# Patient Record
Sex: FEMALE | Race: WHITE
[De-identification: names, ages, dates, MRNs, and addresses within clinical notes are randomized per-mention and may not be internally consistent; named-entity substitution may affect disease eponyms.]

---

## 2017-06-30 ENCOUNTER — HOSPITAL ENCOUNTER (EMERGENCY)
Dept: HOSPITAL 61 - ER | Age: 52
Discharge: HOME | End: 2017-06-30
Payer: SELF-PAY

## 2017-06-30 VITALS — SYSTOLIC BLOOD PRESSURE: 140 MMHG | DIASTOLIC BLOOD PRESSURE: 89 MMHG

## 2017-06-30 VITALS — BODY MASS INDEX: 33.13 KG/M2 | WEIGHT: 180 LBS | HEIGHT: 62 IN

## 2017-06-30 DIAGNOSIS — K08.89: ICD-10-CM

## 2017-06-30 DIAGNOSIS — K02.9: Primary | ICD-10-CM

## 2017-06-30 PROCEDURE — 99283 EMERGENCY DEPT VISIT LOW MDM: CPT

## 2017-06-30 NOTE — PHYS DOC
Past Medical History


Past Medical History:  No Pertinent History


Additional Past Medical Histor:  Kwabena gaona


Past Surgical History:  Other


Additional Past Surgical Histo:  Right wrist


Alcohol Use:  None


Drug Use:  None


Social History Narrative:  Recovering Amphetamine User





Adult General


Chief Complaint


Chief Complaint:  DENTAL PROBLEM





HPI


HPI





Patient is a 51  year old female who presents with moderate right lower dental 

pain that began a couple days ago. Patient states the pain is worse when she is 

eating. Patient denies any fever or trismus. She does not have a dentist.





Review of Systems


Review of Systems





Constitutional: Denies fever or chills []


Eyes: Denies change in visual acuity, redness, or eye pain []


HENT: Dental pain


Musculoskeletal: Denies back pain or joint pain []


Integument: Denies rash or skin lesions []


Neurologic: Denies headache, focal weakness or sensory changes []


Endocrine: Denies polyuria or polydipsia []





Allergies


Allergies





Allergies








Coded Allergies Type Severity Reaction Last Updated Verified


 


  No Known Drug Allergies    7/19/15 No











Physical Exam


Physical Exam





Constitutional: Well developed, well nourished, no acute distress, non-toxic 

appearance. []


HENT: Normocephalic, atraumatic, bilateral external ears normal, oropharynx 

moist, no oral exudates, nose normal. []


Teeth number #31, 30 and 29  are infected.


Eyes: PERRLA, EOMI, conjunctiva normal, no discharge. [] 


Skin: Warm, dry, no erythema, no rash. [] 


Back: No tenderness, no CVA tenderness. [] 


Extremities: No tenderness, no cyanosis, no clubbing, ROM intact, no edema. [] 


Neurologic: Alert and oriented X 3, normal motor function, normal sensory 

function, no focal deficits noted. []


Psychologic: Affect normal, judgement normal, mood normal. []





Current Patient Data


Vital Signs





 Vital Signs








  Date Time  Temp Pulse Resp B/P (MAP) Pulse Ox O2 Delivery O2 Flow Rate FiO2


 


6/30/17 20:30 98.0 85 16  94 Room Air  





 98.0       











EKG


EKG


[]





Radiology/Procedures


Radiology/Procedures


[]





Course & Med Decision Making


Course & Med Decision Making


Pertinent Labs and Imaging studies reviewed. (See chart for details)





Patient is in the ED with dental infection from dental caries. Discharged with 

amoxicillin for 10 days. Tylenol/Motrin prescriptions provided for pain. Follow-

up with dentist in 1-2 weeks.





Dragon Disclaimer


Dragon Disclaimer


This electronic medical record was generated, in whole or in part, using a 

voice recognition dictation system.





Departure


Departure


Impression:  


 Primary Impression:  


 Dentalgia


 Additional Impression:  


 Dental caries


Disposition:  01 HOME, SELF-CARE


Condition:  STABLE


Referrals:  


NO PCP (PCP)


Patient Instructions:  Dental Caries





Additional Instructions:  


You were seen for dental pain and infection. Please complete your antibiotics.


Scripts


Ibuprofen (IBUPROFEN) 800 Mg Tablet


800 MG PO PRN Q6HRS Y for INFLAMMATION, #30 TAB


   Prov: MIGUEL ALVES         6/30/17 


Acetaminophen (TYLENOL) 325 Mg Tablet


1-2 TAB PO Q4HRS W/A Y for PAIN, #60 TAB 2 Refills


   Prov: MIGUEL ALVES         6/30/17 


Amoxicillin (AMOXICILLIN) 500 Mg Capsule


1 CAP PO TID, #30 CAP


   Prov: MIGUEL ALVES         6/30/17





Problem Qualifiers











MIGUEL AVLES Jun 30, 2017 20:44

## 2019-07-03 ENCOUNTER — HOSPITAL ENCOUNTER (EMERGENCY)
Dept: HOSPITAL 61 - ER | Age: 54
Discharge: HOME | End: 2019-07-03
Payer: SELF-PAY

## 2019-07-03 VITALS — WEIGHT: 220 LBS | BODY MASS INDEX: 41.54 KG/M2 | HEIGHT: 61 IN

## 2019-07-03 VITALS — DIASTOLIC BLOOD PRESSURE: 69 MMHG | SYSTOLIC BLOOD PRESSURE: 113 MMHG

## 2019-07-03 DIAGNOSIS — M25.561: Primary | ICD-10-CM

## 2019-07-03 DIAGNOSIS — F17.210: ICD-10-CM

## 2019-07-03 DIAGNOSIS — G51.0: ICD-10-CM

## 2019-07-03 PROCEDURE — 73562 X-RAY EXAM OF KNEE 3: CPT

## 2019-07-03 PROCEDURE — 99284 EMERGENCY DEPT VISIT MOD MDM: CPT

## 2019-07-03 NOTE — RAD
Three-view right knee radiographs 7/3/2019

 

CLINICAL HISTORY: Right knee pain and swelling.

 

AP, lateral and oblique digital radiographs of the right knee were 

obtained. No fracture or dislocation of the right knee is seen. Mild 

degenerative changes are seen involving all 3 compartments of the right 

knee.

 

IMPRESSION: Mild degenerative changes are seen involving the right knee. 

No acute osseous abnormality is seen.

 

Electronically signed by: Enmanuel Berrios MD (7/3/2019 9:10 PM) [facility]

## 2019-07-03 NOTE — PHYS DOC
Past Medical History


Past Medical History:  No Pertinent History


Additional Past Medical Histor:  Kwabena gaona


Past Surgical History:  Other


Additional Past Surgical Histo:  Right wrist


Smoking:  Cigarettes, Less than 1pk/day


Alcohol Use:  None


Drug Use:  None





Adult General


Chief Complaint


Chief Complaint:  KNEE INJURY





\[facility]\""


HPI





Patient is a 53  year old male presents with right knee pain has been ongoing 

since Monday. 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 

her knee started hurting and became swollen. The patient states she's been 

taking Norco at home which is helped. The patient currently has 0 out of 10 

pain. Has posterior pain to the back of her knee and edema all over the knee.





Review of Systems


Review of Systems





Constitutional: Denies fever or chills []


Eyes: Denies change in visual acuity, redness, or eye pain []


HENT: Denies nasal congestion or sore throat []


Respiratory: Reports cough but denies shortness of breath []


Cardiovascular: No additional information not addressed in HPI []


GI: Denies abdominal pain, nausea, vomiting, bloody stools or diarrhea []


: Denies dysuria or hematuria []


Musculoskeletal: Denies back pain but reports R knee pain.


Integument: Denies rash or skin lesions []


Neurologic: Denies headache, focal weakness or sensory changes []


Endocrine: Denies polyuria or polydipsia []





Complete systems were reviewed and found to be within normal limits, except as 

documented in this note.





Allergies


Allergies





Allergies








Coded Allergies Type Severity Reaction Last Updated Verified


 


  No Known Drug Allergies    7/19/15 No











Physical Exam


Physical Exam





Constitutional: Well developed, well nourished, no acute distress, non-toxic 

appearance. []


HENT: Normocephalic, atraumatic, bilateral external ears normal, oropharynx mois

t, no oral exudates, nose normal. []


Eyes: PERRLA, EOMI, conjunctiva normal, no discharge. [] 


Neck: Normal range of motion, no tenderness, supple, no stridor. [] 


Cardiovascular:Heart rate regular rhythm, no murmur []


Lungs & Thorax:  Bilateral breath sounds clear to auscultation []


Abdomen: Bowel sounds normal, soft, no tenderness, no masses, no pulsatile 

masses. [] 


Skin: Warm, dry, no erythema, no rash. [] 


Back: No tenderness, no CVA tenderness. [] 


Extremities: Tenderness to right knee, ROM reduced, has edema. [] 


Neurologic: Alert and oriented X 3, normal motor function, normal sensory 

function, no focal deficits noted. []


Psychologic: Affect normal, judgement normal, mood normal. []





Current Patient Data


Vital Signs





                                   Vital Signs








  Date Time  Temp Pulse Resp B/P (MAP) Pulse Ox O2 Delivery O2 Flow Rate FiO2


 


7/3/19 17:30 98.1 84 16 116/74 (88) 97 Room Air  





 98.1       











EKG


EKG


[]





Radiology/Procedures


Radiology/Procedures


Interpreted by Dr. Thaddeus GUSMAN knee x-ray, No obvious displaced fracture.[]





Course & Med Decision Making


Course & Med Decision Making


Pertinent Labs and Imaging studies reviewed. (See chart for details)





X-ray obtained will d/c home to follow up with primary care and ortho.





Dragon Disclaimer


Dragon Disclaimer


This electronic medical record was generated, in whole or in part, using a voice

 recognition dictation system.





Departure


Departure


Impression:  


   Primary Impression:  


   Knee pain, right


Disposition:  01 HOME, SELF-CARE


Condition:  STABLE


Referrals:  


NO PCP (PCP)


Patient Instructions:  Knee Pain, Knee Wraps (Elastic Bandage) and RICE





Additional Instructions:  


Thank you for visiting [facility]. We appreciate you trusting us 

with your care. If any additional problems come up don't hesitate to return to 

visit us. Please follow up with your primary care provider so they can plan 

additional care if needed and know about the problem that you had. If symptoms 

worsen come back to the Emergency Department. Any concerning symptoms that start

 such as chest pain, shortness of Air, weakness or numbness on one side of the 

body, running high fevers or any other concerning symptoms return to the ER.








Please follow up with a primary care doctor and orthopedics if the pain 

persists. Follow [facility]


Scripts


Naproxen (NAPROXEN) 500 Mg Tablet.dr


1 TAB PO BID PRN for PAIN for 14 Days, #28 TAB 1 Refill


   Prov: ADELINA DOYLE         7/3/19





Problem Qualifiers








   Primary Impression:  


   Knee pain, right


   Chronicity:  acute  Qualified Codes:  M25.561 - Pain in right knee








ADELINA DOYLE           Jul 3, 2019 18:58